# Patient Record
Sex: MALE | Race: WHITE | NOT HISPANIC OR LATINO | Employment: FULL TIME | URBAN - METROPOLITAN AREA
[De-identification: names, ages, dates, MRNs, and addresses within clinical notes are randomized per-mention and may not be internally consistent; named-entity substitution may affect disease eponyms.]

---

## 2024-06-29 ENCOUNTER — HOSPITAL ENCOUNTER (EMERGENCY)
Facility: HOSPITAL | Age: 59
Discharge: HOME | End: 2024-06-30
Attending: STUDENT IN AN ORGANIZED HEALTH CARE EDUCATION/TRAINING PROGRAM
Payer: COMMERCIAL

## 2024-06-29 ENCOUNTER — APPOINTMENT (OUTPATIENT)
Dept: CARDIOLOGY | Facility: HOSPITAL | Age: 59
End: 2024-06-29
Payer: COMMERCIAL

## 2024-06-29 DIAGNOSIS — T78.2XXA ANAPHYLAXIS, INITIAL ENCOUNTER: ICD-10-CM

## 2024-06-29 DIAGNOSIS — T63.444A BEE STING, UNDETERMINED INTENT, INITIAL ENCOUNTER: Primary | ICD-10-CM

## 2024-06-29 PROCEDURE — 2500000004 HC RX 250 GENERAL PHARMACY W/ HCPCS (ALT 636 FOR OP/ED): Performed by: STUDENT IN AN ORGANIZED HEALTH CARE EDUCATION/TRAINING PROGRAM

## 2024-06-29 PROCEDURE — 96374 THER/PROPH/DIAG INJ IV PUSH: CPT

## 2024-06-29 PROCEDURE — 96375 TX/PRO/DX INJ NEW DRUG ADDON: CPT

## 2024-06-29 PROCEDURE — 93005 ELECTROCARDIOGRAM TRACING: CPT

## 2024-06-29 PROCEDURE — 99291 CRITICAL CARE FIRST HOUR: CPT | Performed by: STUDENT IN AN ORGANIZED HEALTH CARE EDUCATION/TRAINING PROGRAM

## 2024-06-29 PROCEDURE — 99284 EMERGENCY DEPT VISIT MOD MDM: CPT | Mod: 25

## 2024-06-29 RX ORDER — EPINEPHRINE 0.3 MG/.3ML
1 INJECTION SUBCUTANEOUS ONCE AS NEEDED
Qty: 1 EACH | Refills: 0 | Status: SHIPPED | OUTPATIENT
Start: 2024-06-29

## 2024-06-29 RX ORDER — FAMOTIDINE 10 MG/ML
20 INJECTION, SOLUTION INTRAVENOUS ONCE
Status: COMPLETED | OUTPATIENT
Start: 2024-06-29 | End: 2024-06-29

## 2024-06-29 RX ADMIN — FAMOTIDINE 20 MG: 10 INJECTION, SOLUTION INTRAVENOUS at 20:13

## 2024-06-29 RX ADMIN — METHYLPREDNISOLONE SODIUM SUCCINATE 125 MG: 125 INJECTION, POWDER, FOR SOLUTION INTRAMUSCULAR; INTRAVENOUS at 20:08

## 2024-06-29 ASSESSMENT — LIFESTYLE VARIABLES
EVER FELT BAD OR GUILTY ABOUT YOUR DRINKING: NO
TOTAL SCORE: 0
HAVE PEOPLE ANNOYED YOU BY CRITICIZING YOUR DRINKING: NO
EVER HAD A DRINK FIRST THING IN THE MORNING TO STEADY YOUR NERVES TO GET RID OF A HANGOVER: NO
HAVE YOU EVER FELT YOU SHOULD CUT DOWN ON YOUR DRINKING: NO

## 2024-06-29 ASSESSMENT — PAIN - FUNCTIONAL ASSESSMENT: PAIN_FUNCTIONAL_ASSESSMENT: 0-10

## 2024-06-29 ASSESSMENT — PAIN SCALES - GENERAL: PAINLEVEL_OUTOF10: 0 - NO PAIN

## 2024-06-29 NOTE — ED TRIAGE NOTES
Patient was stung by (likely) bees today around 6:45pm, likely 3-5 times. Self-administered epi pen, but it was . (History of allergic reaction to bees about 3 years ago, was stung about  times at this past instance. Reported swelling lips, vision changes, rash to upper extremities and chest, itchiness, dizziness. No SOB. EMS administered 0.5 epi at 1910, 4 mg IV zofran, 50 mg IV benadryl, 500mL NS. Patient shivering at this time but reports improvement in symptoms. Reports stings to left thigh, right hand, left elbow, right heel.

## 2024-06-29 NOTE — ED PROVIDER NOTES
History/Exam limitations: none  HPI was provided by patient    HPI:    Chief Complaint   Patient presents with    Insect Bite    Allergic Reaction     Patient was stung by (likely) bees today around 6:45pm, likely 3-5 times. Self-administered epi pen, but it was . (History of allergic reaction to bees about 3 years ago, was stung about  times at this past instance. Reported swelling lips, vision changes, rash to upper extremities and chest, itchiness, dizziness. No SOB. EMS administered 0.5 epi at 1910, 4 mg IV zofran, 50 mg IV benadryl, 500mL NS. Patient shivering at this time but reports improvement in symptoms.         Reji Otero is a 58 y.o. male presents with chief complaint of of allergic reaction.  He states around 630 he had been working outside putting bulbs him when he stepped on a beehive from the ground.  States he is allergic to the base but unsure how allergic as last time he used on was from 40+.  He did have EpiPen leftover from the incident and took it but it was .  He has swelling to his lips and diffuse hives.  States he was stung in his right ankle and left hand.  Symptoms have been improving since EMS had given him Benadryl, epi, Zofran and normal saline.    Denies any tongue swelling or oral swelling only swelling to the lips.     ROS: All other review of systems are negative except as noted above and HPI or ROS.   CONSTITUTIONAL: fever, chills  ENT: sore throat, congestion, rhinorrhea  CARDIOVASCULAR: chest pain, palpitations, swelling  RESPIRATORY: cough, wheeze, shortness of breath  GI: nausea, vomiting, diarrhea, abdominal pain  GENITOURINARY: dysuria, hematuria, frequency  MUSCULOSKELETAL: deformity, neck pain  SKIN: rash, lesion  NEUROLOGIC: headache, numbness, focal weakness  NOTES: All systems reviewed, negative except as described above       Physical Exam:  GENERAL: Alert, oriented , cooperative,  in no acute distress.    HEAD: normocephalic,  atraumatic    SKIN:  dry skin, hives present on all 4 extremities and torso.  No appreciable stinger still present.    EYES: PERRL, EOMs intact,  Conjunctiva pink with no erythema or exudates. No scleral icterus.     ENT: No external deformities. Nares patent, mucus membranes moist.  Pharynx clear, uvula midline.  Handling secretions well, swelling to lips.  No swelling to tongue or oropharynx and airway is patent.    NECK: Supple, without meningismus. Trachea at midline. No lymphadenopathy.  No swelling    PULMONARY: Clear bilaterally. No crackles, rhonchi, wheezing.  No respiratory distress.  No work of breathing.    CARDIAC: Regular rate and regular rhythm.  Pulses 2+ in radials and dorsal pedal pulses bilaterally.  No murmur, rub, gallop.  No edema.    ABDOMEN: Soft, nontender, active bowel sounds.  No palpable organomegaly.  No rebound or guarding.  No CVA tenderness.  No pulsatile masses.      MUSCULOSKELETAL: Full range of motion throughout, no deformity.     NEUROLOGICAL:  no focal neuro deficits.  Strength 5 out of 5 throughout bilateral upper and lower extremities neurovascular intact in bilateral upper and lower extremities    PSYCHIATRIC: Appropriate mood and affect. Calm.       MDM/ED COURSE:      The patient presented for evaluation of allergic reaction.    The patient was given Solu-Medrol, and Pepcid and observed here until there symptoms completely resolved  .  Patient handling secretions well without any respiratory complaints.    When the patient was reevaluated at bedside there symptoms were completely resolved and was asymptomatic.  I spoke with them about strict return precautions to return here to the ER.  Patient feels safe for discharge home.  Patient nontoxic-appearing on reexamination.  Vital signs are stable.  The patient and caregiver are in agreement with the plan and given instructions to follow up with their PCP.     Patient was given epinephrine and will be watched here for at least  4 hours        I discussed the differential, results and plan with the patient and/or family/friend/caregiver if present.       I emphasized the importance of follow-up with the physician I referred them to in the timeframe recommended.  I explained reasons for the patient to return to the Emergency Department. Additional verbal discharge instructions were also given and discussed with the patient to supplement those generated by the EMR. We also discussed medications that were prescribed (if any) including common side effects and interactions. The patient was advised to abstain from driving, operating heavy machinery or making significant decisions while taking medications such as opiates and muscle relaxers that may impair this. All questions were addressed.  They understand return precautions and discharge instructions. The patient and/or family/friend/caregiver expressed understanding.     ED Course as of 24   Sat  EKG interpreted by me shows sinus bradycardia.  No STEMI.  Rate 57 VA interval 140  QTc 455.  Compared to prior EKG there is no prior to compare [WL]    On reevaluation he is asymptomatic.  Was given EpiPen abrasions as well as . [WL]      ED Course User Index  [WL] Ruben Goodwin,          Diagnoses as of 24   Bee sting, undetermined intent, initial encounter   Anaphylaxis, initial encounter       Note: This note was dictated by speech recognition. Minor errors in transcription may be present.          No past medical history on file.   Social History     Socioeconomic History    Marital status:      Spouse name: Not on file    Number of children: Not on file    Years of education: Not on file    Highest education level: Not on file   Occupational History    Not on file   Tobacco Use    Smoking status: Not on file    Smokeless tobacco: Not on file   Substance and Sexual Activity    Alcohol use: Not on file    Drug use: Not on file     Sexual activity: Not on file   Other Topics Concern    Not on file   Social History Narrative    Not on file     Social Determinants of Health     Financial Resource Strain: Low Risk  (7/21/2023)    Received from [facility]    Overall Financial Resource Strain (CARDIA)     Difficulty of Paying Living Expenses: Not hard at all   Food Insecurity: No Food Insecurity (7/21/2023)    Received from [facility]    Hunger Vital Sign     Worried About Running Out of Food in the Last Year: Never true     Ran Out of Food in the Last Year: Never true   Transportation Needs: No Transportation Needs (7/21/2023)    Received from [facility]    PRAPARE - Transportation     Lack of Transportation (Medical): No     Lack of Transportation (Non-Medical): No   Physical Activity: Insufficiently Active (7/21/2023)    Received from [facility]    Exercise Vital Sign     Days of Exercise per Week: 3 days     Minutes of Exercise per Session: 40 min   Stress: Stress Concern Present (7/21/2023)    Received from [facility]    [place] [employer] - Occupational Stress Questionnaire     Feeling of Stress : Rather much   Social Connections: Moderately Isolated (7/21/2023)    Received from [facility]    Social Connection and Isolation Panel [NHANES]     Frequency of Communication with Friends and Family: Three times a week     Frequency of Social Gatherings with Friends and Family: Three times a week     Attends Congregation Services: More than 4 times per year     Active Member of Clubs or Organizations: No     Attends Club or Organization Meetings: Never     Marital Status:    Intimate Partner Violence: Not on file   Housing Stability: Unknown (7/21/2023)    Received from [facility]    Housing Stability Vital Sign     Unable to Pay for Housing in the Last Year: No     Number of Places Lived in the Last Year: Not on file     Unstable Housing in the Last Year: No     Current Outpatient  Medications   Medication Instructions    EPINEPHrine (EPIPEN) 0.3 mg, intramuscular, Once as needed, Inject into upper leg. Call 911 after use.     No Known Allergies          ED Triage Vitals   Temp Pulse Resp BP   -- -- -- --      SpO2 Temp src Heart Rate Source Patient Position   -- -- -- --      BP Location FiO2 (%)     -- --               Labs and Imaging  No orders to display     Labs Reviewed - No data to display        Procedure  Critical Care    Performed by: Ruben Goodwin DO  Authorized by: Ruben Goodwin DO    Critical care provider statement:     Critical care time (minutes):  31    Critical care time was exclusive of:  Separately billable procedures and treating other patients    Critical care was necessary to treat or prevent imminent or life-threatening deterioration of the following conditions: Anaphylaxis.    Critical care was time spent personally by me on the following activities:  Ordering and performing treatments and interventions, pulse oximetry, re-evaluation of patient's condition, review of old charts, examination of patient, evaluation of patient's response to treatment, development of treatment plan with patient or surrogate and obtaining history from patient or surrogate                    Ruben Goodwin DO  06/29/24 [id number]

## 2024-06-30 VITALS
RESPIRATION RATE: 20 BRPM | HEIGHT: 73 IN | BODY MASS INDEX: 28.89 KG/M2 | WEIGHT: 218 LBS | HEART RATE: 51 BPM | SYSTOLIC BLOOD PRESSURE: 130 MMHG | TEMPERATURE: 97.2 F | DIASTOLIC BLOOD PRESSURE: 80 MMHG | OXYGEN SATURATION: 98 %

## 2024-06-30 ASSESSMENT — PAIN SCALES - GENERAL: PAINLEVEL_OUTOF10: 0 - NO PAIN

## 2024-06-30 ASSESSMENT — PAIN - FUNCTIONAL ASSESSMENT: PAIN_FUNCTIONAL_ASSESSMENT: 0-10

## 2024-07-07 LAB
ATRIAL RATE: 57 BPM
P AXIS: 39 DEGREES
P OFFSET: 205 MS
P ONSET: 147 MS
PR INTERVAL: 140 MS
Q ONSET: 217 MS
QRS COUNT: 10 BEATS
QRS DURATION: 104 MS
QT INTERVAL: 468 MS
QTC CALCULATION(BAZETT): 455 MS
QTC FREDERICIA: 460 MS
R AXIS: 57 DEGREES
T AXIS: 29 DEGREES
T OFFSET: 451 MS
VENTRICULAR RATE: 57 BPM